# Patient Record
(demographics unavailable — no encounter records)

---

## 2025-02-25 NOTE — HISTORY OF PRESENT ILLNESS
[Eats meals with family] : eats meals with family [Grade: ____] : Grade: [unfilled] [Normal Performance] : normal performance [Normal Behavior/Attention] : normal behavior/attention [Normal Homework] : normal homework [Eats regular meals including adequate fruits and vegetables] : eats regular meals including adequate fruits and vegetables [Has friends] : has friends [Uses safety belts/safety equipment] : uses safety belts/safety equipment  [No] : Patient has not had sexual intercourse [Has ways to cope with stress] : has ways to cope with stress [With Teen] : teen [At least 1 hour of physical activity a day] : does not do at least 1 hour of physical activity a day [Screen time (except homework) less than 2 hours a day] : no screen time (except homework) less than 2 hours a day [Uses electronic nicotine delivery system] : does not use electronic nicotine delivery system [Exposure to electronic nicotine delivery system] : no exposure to electronic nicotine delivery system [Uses tobacco] : does not use tobacco [Exposure to tobacco] : no exposure to tobacco [Uses drugs] : does not use drugs  [Exposure to drugs] : no exposure to drugs [Drinks alcohol] : does not drink alcohol [Exposure to alcohol] : no exposure to alcohol [Has problems with sleep] : does not have problems with sleep [Gets depressed, anxious, or irritable/has mood swings] : does not get depressed, anxious, or irritable/has mood swings [Has thought about hurting self or considered suicide] : has not thought about hurting self or considered suicide [FreeTextEntry7] : new pt [de-identified] : none [de-identified] : lives with father and brother [FreeTextEntry1] : pt here for CPE for work clearance feeling well no complaints

## 2025-02-25 NOTE — RISK ASSESSMENT
[No Increased risk of SCA or SCD] : No Increased risk of SCA or SCD    [Little interest or pleasure doing things] : 1) Little interest or pleasure doing things [Feeling down, depressed, or hopeless] : 2) Feeling down, depressed, or hopeless [0] : 2) Feeling down, depressed, or hopeless: Not at all (0) [[id number]] : 0 [Have you ever fainted, passed out or had an unexplained seizure suddenly and without warning, especially during exercise or in response] : Have you ever fainted, passed out or had an unexplained seizure suddenly and without warning, especially during exercise or in response to sudden loud noises such as doorbells, alarm clocks and ringing telephones? No [Have you ever had exercise-related chest pain or shortness of breath?] : Have you ever had exercise-related chest pain or shortness of breath? No [Has anyone in your immediate family (parents, grandparents, siblings) or other more distant relatives (aunts, uncles, cousins)  of heart] : Has anyone in your immediate family (parents, grandparents, siblings) or other more distant relatives (aunts, uncles, cousins)  of heart problems or had an unexpected sudden death before age 50 (This would include unexpected drownings, unexplained car accidents in which the relative was driving or sudden infant death syndrome.)? No [Are you related to anyone with hypertrophic cardiomyopathy or hypertrophic obstructive cardiomyopathy, Marfan syndrome, arrhythmogenic] : Are you related to anyone with hypertrophic cardiomyopathy or hypertrophic obstructive cardiomyopathy, Marfan syndrome, arrhythmogenic right ventricular cardiomyopathy, long QT syndrome, short QT syndrome, Brugada syndrome or catecholaminergic polymorphic ventricular tachycardia, or anyone younger than 50 years with a pacemaker or implantable defibrillator? No

## 2025-02-25 NOTE — PHYSICAL EXAM
[Jayson: _____] : Jayson [unfilled] [Circumcised] : circumcised [Bilateral descended testes] : bilateral descended testes [No Testicular Masses] : no testicular masses [FreeTextEntry6] : by histort

## 2025-02-25 NOTE — DISCUSSION/SUMMARY
[FreeTextEntry1] : Routine CPE Vaccinations: needs menACWY- will get with PCP HGB: 15.4 Counseled regarding dental hygiene, seatbelt safety, Healthy Lifestyle 5210, and healthy relationships. Routine dental/ophtho care. Health report card sent home. anticipatory guidance given   reviewed BMI and BMI%    Lipid profile, HGBA1C, ALT being followed by PCP will get bloodwork there   Nutritional counselling done . Discussed decreasing sugary drinks, soda, juice, sweet tea and increase exercise, walking, dancing  sports participation, etc.   recommend return for continuing nutritional counselling.